# Patient Record
Sex: MALE | Race: WHITE
[De-identification: names, ages, dates, MRNs, and addresses within clinical notes are randomized per-mention and may not be internally consistent; named-entity substitution may affect disease eponyms.]

---

## 2019-09-09 ENCOUNTER — HOSPITAL ENCOUNTER (EMERGENCY)
Dept: HOSPITAL 96 - M.ERS | Age: 49
Discharge: HOME | End: 2019-09-09
Payer: COMMERCIAL

## 2019-09-09 VITALS — WEIGHT: 150 LBS | BODY MASS INDEX: 24.99 KG/M2 | HEIGHT: 65 IN

## 2019-09-09 VITALS — SYSTOLIC BLOOD PRESSURE: 106 MMHG | DIASTOLIC BLOOD PRESSURE: 74 MMHG

## 2019-09-09 DIAGNOSIS — I10: ICD-10-CM
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
## 2019-09-09 DIAGNOSIS — S61.213A: Primary | ICD-10-CM
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
## 2019-10-19 ENCOUNTER — HOSPITAL ENCOUNTER (INPATIENT)
Dept: HOSPITAL 96 - M.ERS | Age: 49
LOS: 1 days | Discharge: HOME | DRG: 920 | End: 2019-10-20
Attending: INTERNAL MEDICINE | Admitting: INTERNAL MEDICINE
Payer: COMMERCIAL

## 2019-10-19 VITALS — SYSTOLIC BLOOD PRESSURE: 102 MMHG | DIASTOLIC BLOOD PRESSURE: 64 MMHG

## 2019-10-19 VITALS — DIASTOLIC BLOOD PRESSURE: 74 MMHG | SYSTOLIC BLOOD PRESSURE: 103 MMHG

## 2019-10-19 VITALS — WEIGHT: 150 LBS | BODY MASS INDEX: 24.99 KG/M2 | HEIGHT: 65 IN

## 2019-10-19 VITALS — DIASTOLIC BLOOD PRESSURE: 68 MMHG | SYSTOLIC BLOOD PRESSURE: 108 MMHG

## 2019-10-19 VITALS — SYSTOLIC BLOOD PRESSURE: 114 MMHG | DIASTOLIC BLOOD PRESSURE: 71 MMHG

## 2019-10-19 VITALS — DIASTOLIC BLOOD PRESSURE: 69 MMHG | SYSTOLIC BLOOD PRESSURE: 102 MMHG

## 2019-10-19 VITALS — SYSTOLIC BLOOD PRESSURE: 98 MMHG | DIASTOLIC BLOOD PRESSURE: 71 MMHG
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
## 2019-10-19 DIAGNOSIS — Z79.899: ICD-10-CM
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
## 2019-10-19 DIAGNOSIS — K91.840: Primary | ICD-10-CM

## 2019-10-19 DIAGNOSIS — K64.8: ICD-10-CM

## 2019-10-19 DIAGNOSIS — K63.3: ICD-10-CM

## 2019-10-19 DIAGNOSIS — F17.210: ICD-10-CM

## 2019-10-19 DIAGNOSIS — K92.1: ICD-10-CM

## 2019-10-19 DIAGNOSIS — D62: ICD-10-CM

## 2019-10-19 DIAGNOSIS — Z90.49: ICD-10-CM

## 2019-10-19 DIAGNOSIS — E86.1: ICD-10-CM

## 2019-10-19 DIAGNOSIS — I10: ICD-10-CM

## 2019-10-19 LAB
ABSOLUTE BASOPHILS: 0.1 THOU/UL (ref 0–0.2)
ABSOLUTE BASOPHILS: 0.1 THOU/UL (ref 0–0.2)
ABSOLUTE EOSINOPHILS: 0.1 THOU/UL (ref 0–0.7)
ABSOLUTE EOSINOPHILS: 0.1 THOU/UL (ref 0–0.7)
ABSOLUTE MONOCYTES: 1 THOU/UL (ref 0–1.2)
ABSOLUTE MONOCYTES: 1.2 THOU/UL (ref 0–1.2)
ALBUMIN SERPL-MCNC: 3.2 G/DL (ref 3.4–5)
ALP SERPL-CCNC: 51 U/L (ref 46–116)
ALT SERPL-CCNC: 43 U/L (ref 30–65)
ANION GAP SERPL CALC-SCNC: 12 MMOL/L (ref 7–16)
AST SERPL-CCNC: 15 U/L (ref 15–37)
BASOPHILS NFR BLD AUTO: 1 %
BASOPHILS NFR BLD AUTO: 1 %
BILIRUB SERPL-MCNC: 0.3 MG/DL
BUN SERPL-MCNC: 19 MG/DL (ref 7–18)
CALCIUM SERPL-MCNC: 8.7 MG/DL (ref 8.5–10.1)
CHLORIDE SERPL-SCNC: 104 MMOL/L (ref 98–107)
CO2 SERPL-SCNC: 21 MMOL/L (ref 21–32)
CREAT SERPL-MCNC: 1.4 MG/DL (ref 0.6–1.3)
EOSINOPHIL NFR BLD: 0.7 %
EOSINOPHIL NFR BLD: 1 %
GLUCOSE SERPL-MCNC: 171 MG/DL (ref 70–99)
GRANULOCYTES NFR BLD MANUAL: 66.7 %
GRANULOCYTES NFR BLD MANUAL: 71.1 %
HCT VFR BLD CALC: 32.2 % (ref 42–52)
HCT VFR BLD CALC: 39.7 % (ref 42–52)
HGB BLD-MCNC: 11.1 GM/DL (ref 14–18)
HGB BLD-MCNC: 13.5 GM/DL (ref 14–18)
LYMPHOCYTES # BLD: 2.1 THOU/UL (ref 0.8–5.3)
LYMPHOCYTES # BLD: 2.5 THOU/UL (ref 0.8–5.3)
LYMPHOCYTES NFR BLD AUTO: 18.4 %
LYMPHOCYTES NFR BLD AUTO: 21.4 %
MCH RBC QN AUTO: 30.2 PG (ref 26–34)
MCH RBC QN AUTO: 30.9 PG (ref 26–34)
MCHC RBC AUTO-ENTMCNC: 33.9 G/DL (ref 28–37)
MCHC RBC AUTO-ENTMCNC: 34.5 G/DL (ref 28–37)
MCV RBC: 88.9 FL (ref 80–100)
MCV RBC: 89.6 FL (ref 80–100)
MONOCYTES NFR BLD: 8.8 %
MONOCYTES NFR BLD: 9.9 %
MPV: 7.3 FL. (ref 7.2–11.1)
MPV: 7.7 FL. (ref 7.2–11.1)
NEUTROPHILS # BLD: 6.6 THOU/UL (ref 1.6–8.1)
NEUTROPHILS # BLD: 9.6 THOU/UL (ref 1.6–8.1)
NUCLEATED RBCS: 0 /100WBC
NUCLEATED RBCS: 0 /100WBC
PLATELET COUNT*: 341 THOU/UL (ref 150–400)
PLATELET COUNT*: 496 THOU/UL (ref 150–400)
POTASSIUM SERPL-SCNC: 4.1 MMOL/L (ref 3.5–5.1)
PROT SERPL-MCNC: 6.4 G/DL (ref 6.4–8.2)
RBC # BLD AUTO: 3.59 MIL/UL (ref 4.5–6)
RBC # BLD AUTO: 4.47 MIL/UL (ref 4.5–6)
RDW-CV: 13.2 % (ref 10.5–14.5)
RDW-CV: 13.3 % (ref 10.5–14.5)
SODIUM SERPL-SCNC: 137 MMOL/L (ref 136–145)
WBC # BLD AUTO: 13.4 THOU/UL (ref 4–11)
WBC # BLD AUTO: 9.9 THOU/UL (ref 4–11)

## 2019-10-19 NOTE — NUR
PT A&OX4 VSS. PT ON CLEAR LIQUIDS UNTIL MIDNIGHT, THEN NPO TO PREPARE FOR
COLONOSCOPY IN THE AM.  1ST DOSE DULCOLAX ADMIN 1600, REMAINDER TO BE GIVEN AT
2000, PT AWARE AND COOPERATIVE. PT UP AD SHERICE, AMB TO RESTROOM GAIT STEADY.
IV TO RAC PATENT, NS RUNNING AT 100ML/HR. PT RESTS IN BED WITH CALL LIGHT IN
REACH. NO COMPLAINTS/CONCERNS AT THIS TIME.

## 2019-10-19 NOTE — CON
[address]  [address]                    CONSULTATION                  
_______________________________________________________________________________
 
Name:       MEREDITH ENCISO           Room:           [address] [place]  
M.R.#:  [id number]      Account #:      [id number]  
Admission:  10/19/19     Attend Phys:    Alejandro Lee MD 
Discharge:               Date of Birth:  [birth year]  
         Report #: [id number]
                                                                     [id number]  
_______________________________________________________________________________
THIS REPORT FOR:  //name//                      
 
CC: Ganesh Lee
 
DATE OF SERVICE:  10/19/2019
 
 
HISTORY OF PRESENT ILLNESS:  This is a 49-year-old male, who presents with
anemia and lower gastrointestinal bleed.  The patient apparently underwent
colonoscopy by Dr. Mae, my partner, and had a polyp removed from his
cecum.  This polyp was 15 mm in size.  The patient then went home and started
having bloody stools.  He denied any abdominal pain, but reports some abdominal
cramping when he has to use the washroom.  He denies any nausea, vomiting or
dyspepsia.  His hemoglobin dropped from 13.5 to 11.7 in the Emergency Room.
 
PAST MEDICAL HISTORY:  Significant for history of gallstones, hypertension, and
laceration of the finger.
 
ALLERGIES:  No known drug allergy.
 
MEDICATIONS:  Please refer to MAR.
 
SOCIAL HISTORY:  The patient is  and lives at home.  He has just
undergone a colonoscopy with polypectomy.  Denies tobacco or alcohol use.
 
FAMILY HISTORY:  Negative for GI malignancy.
 
PHYSICAL EXAMINATION:
VITAL SIGNS:  Reveals normal vitals.
LUNGS:  Clear.
CARDIOVASCULAR:  Regular.
ABDOMEN:  Soft, nontender, nondistended.  Bowel sounds are positive.
 
IMPRESSION AND PLAN:  The patient is with post-polypectomy bleed, whose
hemoglobin has dropped from 13.5 to 11.7.  We will prep him and perform a
colonoscopy and cauterized his polypectomy site and possibly put clips if
needed.  The patient is agreeable with plan.
 
 
 
 
 
 
                       
                                        By:                                
                 
D: 10/19/19 1308_______________________________________
T: 10/19/19 1332Hugo Story MD               /nt

## 2019-10-19 NOTE — NUR
AT 0800 EXPLAINED POC WITH PT ON ADMISSION STATUS AND PROCESS THAT WILL BE
TAKING PLACE; THAT GI HAS BEEN CONSULTED AND CALLED. PT STATES CONCERNED WHEN
THE GI SPECIALIST WILL BE IN TO SEE HIM AND I STATE THEY HAVE BEEN NOTIFIED AND
WILL BE COMING IN TO SEE HIM TODAY AND WE DO NOT KNOW THE TIME FRAME AT THIS
TIME, BUT SPECIALIST WILL EITHER TALK WITH HIM IN THE ER IF HE IS STILL DOWN
HERE OR IN HIS ROOM WHEN HE GETS MOVED, PT VERBALIZED UNDERSTANDING AT THIS
TIME. I STATED THE PROCESS OF ADMISSION BED STATUS THAT WE ARE WAITING FOR A
ROOM ASSIGNMENT AND DISCHARGES, AS SOON AS I FIND OUT WHAT THE ROOM STATUS HE 
WILL BE NOTIFIED PROMPTLY. PT STATES HE DOESNT UNDERSTAND WHY HE HAS TO WAIT
WHY HE HAS TO BE MOVED. I STATED ITS DUE TO HOSPITAL VOLUME AND WAITING ON
PENDING DISCHARGES PUT WE WILL GET HIM TRANSFERRED SOON, BUT WE WILL MAKE HIM
COMFORTABLE IN THE ER, MONITORING HIM CLOSELY AND IF HE NEEDS ANYTHING AT ALL
TO PLEASE LET US KNOW. CALL LIGHT IS WITHIN REACH AND SPOUSE AT SIDE AND PT
VERBALIZES UNDERSTANDING. PT DEMANDS ICE CHIPS AT THIS TIME AND STATED HE CAN
NOT HAVE ANYTHING AT THIS TIME DUE TO UNKNOWN ANTICIPATED PROCEDURES BY GI
SPECIALIST AND ORDERED BY PHYSICIAN NOT TO HAVE ANYTHING BY MOUTH, BUT HE IS
BEING HYDRATED BY IV FLUIDS AT THIS TIME AND PT VERBALIZES UNDERSTANDING.
CONTINUE TO MONITOR PT

## 2019-10-19 NOTE — NUR
PATIENT AND SPOUSE STATE TO DR DOMINGUEZ THEY ARE UNHAPPY ABOUT ABOUT THE 
ADMISSION PROCESS AND GI SPECIALIST NOT BEING CONSULTED UNTIL 7 AM AND WANT THE
HOUSE SUPERVISOR CONTACTED. CALLED LYNDSEY, HOUSE SUPERVISOR TO SPEAK WITH FAMILY

## 2019-10-20 VITALS — SYSTOLIC BLOOD PRESSURE: 105 MMHG | DIASTOLIC BLOOD PRESSURE: 61 MMHG

## 2019-10-20 VITALS — SYSTOLIC BLOOD PRESSURE: 114 MMHG | DIASTOLIC BLOOD PRESSURE: 73 MMHG

## 2019-10-20 VITALS — DIASTOLIC BLOOD PRESSURE: 61 MMHG | SYSTOLIC BLOOD PRESSURE: 105 MMHG

## 2019-10-20 PROCEDURE — 0W3P8ZZ CONTROL BLEEDING IN GASTROINTESTINAL TRACT, VIA NATURAL OR ARTIFICIAL OPENING ENDOSCOPIC: ICD-10-PCS | Performed by: INTERNAL MEDICINE

## 2019-10-20 NOTE — NUR
REFUSED MORNING LABS UNTIL HE SEES  THIS MORNING. REPORTS NO MORE BLOOD IN
STOOL. HAD SEVERAL LOOSE STOOLS DURING THE NIGHT. HE IS ANXIOUS TO LEAVE TODAY

## 2019-10-20 NOTE — NUR
PT REMAINED ALERT AND ORIENTED. PT HAD COLONOSCOPY TODAY. PT RESTING IN BED.
FALL RISK PRECAUTIONS IN PLACE. HOURLY ROUNDING COMPLETED. WILL CONTINUE TO
MONITOR.